# Patient Record
Sex: FEMALE
[De-identification: names, ages, dates, MRNs, and addresses within clinical notes are randomized per-mention and may not be internally consistent; named-entity substitution may affect disease eponyms.]

---

## 2018-02-02 ENCOUNTER — HOSPITAL ENCOUNTER (INPATIENT)
Dept: HOSPITAL 5 - TRG | Age: 28
LOS: 1 days | Discharge: HOME | End: 2018-02-03
Attending: OBSTETRICS & GYNECOLOGY | Admitting: OBSTETRICS & GYNECOLOGY
Payer: MEDICAID

## 2018-02-02 DIAGNOSIS — Z3A.39: ICD-10-CM

## 2018-02-02 DIAGNOSIS — Z23: ICD-10-CM

## 2018-02-02 LAB
HCT VFR BLD CALC: 34.3 % (ref 30.3–42.9)
HGB BLD-MCNC: 11.4 GM/DL (ref 10.1–14.3)
MCH RBC QN AUTO: 29 PG (ref 28–32)
MCHC RBC AUTO-ENTMCNC: 33 % (ref 30–34)
MCV RBC AUTO: 86 FL (ref 79–97)
PLATELET # BLD: 187 K/MM3 (ref 140–440)
RBC # BLD AUTO: 3.99 M/MM3 (ref 3.65–5.03)

## 2018-02-02 PROCEDURE — 10907ZC DRAINAGE OF AMNIOTIC FLUID, THERAPEUTIC FROM PRODUCTS OF CONCEPTION, VIA NATURAL OR ARTIFICIAL OPENING: ICD-10-PCS | Performed by: OBSTETRICS & GYNECOLOGY

## 2018-02-02 PROCEDURE — 85027 COMPLETE CBC AUTOMATED: CPT

## 2018-02-02 PROCEDURE — 86592 SYPHILIS TEST NON-TREP QUAL: CPT

## 2018-02-02 PROCEDURE — 36415 COLL VENOUS BLD VENIPUNCTURE: CPT

## 2018-02-02 PROCEDURE — 86901 BLOOD TYPING SEROLOGIC RH(D): CPT

## 2018-02-02 PROCEDURE — 00HU33Z INSERTION OF INFUSION DEVICE INTO SPINAL CANAL, PERCUTANEOUS APPROACH: ICD-10-PCS | Performed by: OBSTETRICS & GYNECOLOGY

## 2018-02-02 PROCEDURE — 59025 FETAL NON-STRESS TEST: CPT

## 2018-02-02 PROCEDURE — 86900 BLOOD TYPING SEROLOGIC ABO: CPT

## 2018-02-02 PROCEDURE — 0HQ9XZZ REPAIR PERINEUM SKIN, EXTERNAL APPROACH: ICD-10-PCS | Performed by: OBSTETRICS & GYNECOLOGY

## 2018-02-02 PROCEDURE — 86850 RBC ANTIBODY SCREEN: CPT

## 2018-02-02 PROCEDURE — 3E0234Z INTRODUCTION OF SERUM, TOXOID AND VACCINE INTO MUSCLE, PERCUTANEOUS APPROACH: ICD-10-PCS | Performed by: OBSTETRICS & GYNECOLOGY

## 2018-02-02 PROCEDURE — 85018 HEMOGLOBIN: CPT

## 2018-02-02 PROCEDURE — 88307 TISSUE EXAM BY PATHOLOGIST: CPT

## 2018-02-02 PROCEDURE — 85014 HEMATOCRIT: CPT

## 2018-02-02 PROCEDURE — 3E0R3BZ INTRODUCTION OF ANESTHETIC AGENT INTO SPINAL CANAL, PERCUTANEOUS APPROACH: ICD-10-PCS | Performed by: OBSTETRICS & GYNECOLOGY

## 2018-02-02 RX ADMIN — OXYTOCIN SCH MLS/HR: 10 INJECTION, SOLUTION INTRAMUSCULAR; INTRAVENOUS at 18:04

## 2018-02-02 RX ADMIN — OXYTOCIN SCH MLS/HR: 10 INJECTION, SOLUTION INTRAMUSCULAR; INTRAVENOUS at 18:05

## 2018-02-02 RX ADMIN — OXYTOCIN SCH MLS/HR: 10 INJECTION, SOLUTION INTRAMUSCULAR; INTRAVENOUS at 17:06

## 2018-02-02 RX ADMIN — SODIUM CHLORIDE, SODIUM LACTATE, POTASSIUM CHLORIDE, AND CALCIUM CHLORIDE SCH MLS/HR: .6; .31; .03; .02 INJECTION, SOLUTION INTRAVENOUS at 15:57

## 2018-02-02 RX ADMIN — SODIUM CHLORIDE, SODIUM LACTATE, POTASSIUM CHLORIDE, AND CALCIUM CHLORIDE SCH MLS/HR: .6; .31; .03; .02 INJECTION, SOLUTION INTRAVENOUS at 17:53

## 2018-02-02 RX ADMIN — SODIUM CHLORIDE, SODIUM LACTATE, POTASSIUM CHLORIDE, AND CALCIUM CHLORIDE SCH MLS/HR: .6; .31; .03; .02 INJECTION, SOLUTION INTRAVENOUS at 13:17

## 2018-02-02 NOTE — EVENT NOTE
Date: 02/02/18





Late entry. Pt comfortable with epidural. FHTs Category I. Cervix 8/90/-1. AROM

- clear fluid. Second dose of Ampicillin hanging now. Begin pitocin 

augmentation as needed. Continue routine intrapartum care.

## 2018-02-02 NOTE — HISTORY AND PHYSICAL REPORT
History of Present Illness


Date of examination: 18


Date of admission: 


18 08:34





Chief complaint: 





contractions 


History of present illness: 





Pt is a 27 year old -American female  WONG 18 at 39w3d who 

presents with regular painful contractions and cervical change from 3-4 cm with 

ambulation. She denies leakage of fluid and vaginal bleeding. She has had 

insufficient prenatal care x 3 visits at [facility] at 12, 22 and 

28 wks complicated by fetal pyelectasis (followed by MFM), glucose intolerance (

pt never completed her 3 hr GTT). Her GBS status in unknown. 





Past History


Past Medical History: no pertinent history


Past Surgical History: no surgical history


Family/Genetic History: diabetes, heart disease, hypertension


Social history: no significant social history





- Obstetrical History


Expected Date of Delivery: 18


Actual Gestation: 39 Week(s) 3 Day(s) 


: 2


Para: 1


Hx # Term Pregnancies: 1


Number of  Pregnancies: 0


Spontaneous Abortions: 0


Induced : 0


Number of Living Children: 1





Medications and Allergies


 Allergies











Allergy/AdvReac Type Severity Reaction Status Date / Time


 


No Known Allergies Allergy   Verified 18 09:18











 Home Medications











 Medication  Instructions  Recorded  Confirmed  Last Taken  Type


 


Prenatal 2/Iron/Folic Acid/Om3 1 each PO DAILY 18 09:00 

History





[Complete Mitch Dha]    1 











Active Meds: 


Active Medications





Butorphanol Tartrate (Stadol)  2 mg IV Q2H PRN


   PRN Reason: Pain , Severe (7-10)


Ephedrine Sulfate (Ephedrine Sulfate)  10 mg IV Q2M PRN


   PRN Reason: Hypotension


Ephedrine Sulfate (Ephedrine Sulfate)  10 mg IV Q2M PRN


   PRN Reason: Hypotension


Fentanyl (Sublimaze)  100 mcg IV Q2H PRN


   PRN Reason: Labor Pain


Lactated Ringer's (Lactated Ringers)  1,000 mls @ 125 mls/hr IV AS DIRECT LAUREN


   Last Admin: 18 15:57 Dose:  125 mls/hr


Ampicillin Sodium (Polycillin/Ns 1 Gm/50 Ml)  1 gm in 50 mls @ 100 mls/hr IV 

Q4HR LAUREN


   PRN Reason: Protocol


Lactated Ringer's (Lactated Ringers)  1,000 mls @ 125 mls/hr IV AS DIRECT LAUREN


Oxytocin/Sodium Chloride (Pitocin/Ns 20 Unit/1000ml Drip)  20 units in 1,000 

mls @ 125 mls/hr IV AS DIRECT LAUREN


Oxytocin/Sodium Chloride (Pitocin/Ns 30 Unit/500ml)  30 units in 500 mls @ 4 mls

/hr IV TITR LAUREN


   PRN Reason: Protocol


Fentanyl/Bupivacaine/Sodium Chlor (Fentanyl-Bupiv 2 Mcg/Ml-0.125%)  200 mcg in 

100 mls @ 12 mls/hr EPIDURAL TITR LAUREN


   PRN Reason: Protocol


Mineral Oil (Mineral Oil)  30 ml PO QHS PRN


   PRN Reason: Constipation


Naloxone HCl (Narcan 0.4 Mg/1 Ml)  0.1 mg IV Q2MIN PRN


   PRN Reason: Res Rate </= 8 or 02 SAT < 92%


Naloxone HCl (Narcan 2 Mg/2 Ml)  0.2 mg IV Q5M PRN


   PRN Reason: Respiratory sedation


Ondansetron HCl (Zofran)  4 mg IV Q8H PRN


   PRN Reason: Nausea And Vomiting


Terbutaline Sulfate (Brethine)  0.25 mg SUB-Q ONCE PRN


   PRN Reason: Hyperstimulation/Hypertonicity


Terbutaline Sulfate (Brethine)  0.25 mg IVP ONCE PRN


   PRN Reason: Hyperstimulation/Hypertonicity











Review of Systems


All systems: negative





- Vital Signs


Vital signs: 


 Vital Signs











Temp Resp


 


 98.3 F   18 


 


 18 09:21  18 09:21








 











Temp Pulse Resp BP Pulse Ox


 


 98.3 F   93 H  18   110/62   98 


 


 18 09:21  18 16:09  18 09:21  18 16:09  18 16:07














- Physical Exam


Breasts: Positive: deferred


Cardiovascular: Regular rate


Lungs: Positive: Clear to auscultation


Abdomen: Positive: soft (gravid )


Uterus: Positive: enlarged (gravid )





- Obstetrical


FHR: category 1


Uterine Contraction Monitor Mode: External


Cervical Dilatation: 4 (per RN )


Uterine Contraction Pattern: Irregular


Uterine Tone Measurement Phase: Resting


Uterine Contraction Intensity: Moderate





Results


Result Diagrams: 


 18 11:00





 Abnormal lab results











  18 Range/Units





  11:00 


 


WBC  12.2 H  (4.5-11.0)  K/mm3








All other labs normal.








Assessment and Plan





A: IUP at 39w3d


    Labor


    Insufficient Prenatal Care


    GBS unknown 


    Fetal Pyelectasis 





P: Admit to labor and delivery.


    GBS prophyalxis


    Routine intrapartum care.

## 2018-02-02 NOTE — PROCEDURE NOTE
OB Delivery Note





- Delivery


Date of Delivery: 18


Surgeon: ROSY RODRÍGUEZ


Estimated blood loss: 300cc





- Vaginal


Delivery presentation: vertex


Intrapartum events: PROM->1hr before delivery


Delivery induction: none


Delivery augmentation: rupture of membranes, pitocin


Delivery monitor: external FHT, external uterine


Route of delivery: 


Delivery placenta: spontaneous


Delivery cord: 3 umbilical vessels


Episiotomy: none


Delivery laceration: 1st degree (perineal and vaginal ), other (Bilateral 

periurethral )


Delivery repair: vicryl


Anesthesia: epidural


Delivery comments: 





Pt was last checked and noted to be 8.5 cm. Pt comfortable with epidural 

without complaint of pressure. FHTs noted to not be on tracing. Upon RN entry 

into the room, the  was noted to be in the bed. NICU called for 

resuscitation. Upon on-call MD entry into the room,  was being 

stimulated by NICU staff. Cord clamped and cut and handed to NICU staff at 

warmer. Cord blood not collected. Placenta delivered spontaneously (3VC, intact)

. Vagina and perineum explored. Bilateral periurethral, first degree vaginal 

and first degree periurethral with 3-0 Vicryl in a standard fashion.  

mL. 





- Infant


  ** A


Apgar at 1 minute: 3


Apgar at 5 minutes: 9


Infant Gender: Male (3382g (7lb 7oz) at 1855 pm)

## 2018-02-03 VITALS — DIASTOLIC BLOOD PRESSURE: 56 MMHG | SYSTOLIC BLOOD PRESSURE: 94 MMHG

## 2018-02-03 LAB
HCT VFR BLD CALC: 36.4 % (ref 30.3–42.9)
HGB BLD-MCNC: 12.2 GM/DL (ref 10.1–14.3)

## 2018-02-03 RX ADMIN — IBUPROFEN SCH: 600 TABLET, FILM COATED ORAL at 12:00

## 2018-02-03 RX ADMIN — IBUPROFEN SCH MG: 600 TABLET, FILM COATED ORAL at 06:20

## 2018-02-03 RX ADMIN — IBUPROFEN SCH MG: 600 TABLET, FILM COATED ORAL at 18:25

## 2018-02-03 RX ADMIN — IBUPROFEN SCH MG: 600 TABLET, FILM COATED ORAL at 00:13

## 2018-02-03 NOTE — DISCHARGE SUMMARY
Providers





- Providers


Date of Admission: 


18 08:34





Date of discharge: 18


Attending physician: 


ROSY RODRÍGUEZ





 





18 22:24


Consult to Lactation Consultant [CONS] Routine 


   Reason For Exam: assistance with breastfeeding, SNS











Primary care physician: 


ROSY RODRÍGUEZ








Hospitalization


Reason for admission: active labor, IUP at term


Delivery: 


Episiotomy: none


Laceration: 1st degree


Other postpartum procedures: none


Postpartum complications: none


Discharge diagnosis: IUP at term delivered


 baby: male


Condition at discharge: Good


Disposition: DC-01 TO HOME OR SELFCARE





Plan





- Discharge Medications


Prescriptions: 


Ibuprofen [Motrin 600 MG tab] 600 mg PO Q6HR #30 tablet





- Provider Discharge Summary


Activity: routine, no sex for 6 weeks, no heavy lifting 4 weeks, no strenuous 

exercise


Diet: routine


Instructions: routine


Additional instructions: 


[]  Smoking cessation referral if applicable(refer to patient education folder 

for contact #)


[]  Refer to [facility] Booklet








Call your doctor immediately for:


* Fever > 100.5


* Heavy vaginal bleeding ( >1 pad per hour)


* Severe persistent headache


* Shortness of breath


* Reddened, hot, painful area to leg or breast


* Drainage or odor from incision.





* Keep incision clean and dry at all times and follow doctor's instructions 

regarding bathing/showering











- Follow up plan


Follow up: 


ROSY RODRÍGUEZ MD [Primary Care Provider] - 


YOBANI BUSTOS CNM [Advanced Practice Nurse] - 


(RTO 4 weeks postpartum


)

## 2018-02-03 NOTE — PROGRESS NOTE
Assessment and Plan





O: VSS AF


A: Stable PP Day 1


PP H/H: 12.2/36.4


P: Stable PP Day 1


D/C home per request





Subjective





- Subjective


Date of service: 18


Patient reports: appetite normal, voiding normally, pain well controlled, 

ambulating normally


[place]: doing well, nursing well





Objective





- Vital Signs


Latest vital signs: 


 Vital Signs











  Temp Pulse Resp BP BP Pulse Ox


 


 18 08:26  98.5 F  77  18   105/64  99


 


 18 06:20    18   


 


 18 00:13    18   


 


 18 00:00  98.4 F  89  20   104/59  96


 


 18 21:00  97.7 F  68  18   99/49 


 


 18 20:24   78   111/60  


 


 18 20:09   66   117/74  


 


 18 19:54   75   113/73  


 


 18 19:40   71     97


 


 18 19:39   77   109/66  


 


 18 19:35   80     98


 


 18 19:30   93 H     100


 


 18 19:25   92 H     99


 


 18 19:24   94 H   137/64  


 


 18 19:20   78     98


 


 18 19:15   78     98


 


 18 19:10   87     98


 


 18 19:09  97.6 F  82  18   109/66 


 


 18 19:07   86   107/55  


 


 18 19:05   88   118/63  


 


 18 19:03   103 H   124/60  


 


 18 19:02   96 H   134/59  


 


 18 18:57   76   104/55   98


 


 18 18:55   74   104/58  


 


 18 18:53   81   102/55  


 


 18 18:52   80     97


 


 18 18:51   78   103/58  


 


 18 18:50   104 H     94


 


 18 18:49   80   101/60  


 


 18 18:47   80   103/57   95


 


 18 18:45   77   100/57  


 


 18 18:43   81   100/57  


 


 18 18:42   87     96


 


 18 18:41   92 H   103/61  


 


 02/02/18 18:39   80   102/58  


 


 02/02/18 18:37   78   103/54   97


 


 02/02/18 18:35   80   102/56  


 


 02/02/18 18:33   80   105/58  


 


 02/02/18 18:32   75   113/56   97


 


 02/02/18 18:29  98.6 F   18   


 


 02/18 18:27   77   110/56   98


 


 02/02/18 18:25   68   109/60  


 


 02/02/18 18:23   93 H   106/61  


 


 02/02/18 18:22   83     97


 


 02/02/18 18:21   74   106/58  


 


 02/02/18 18:19   70   103/55  


 


 02//18 18:17   75   106/57   97


 


 02/0218 18:15   88   112/64  


 


 02/0218 18:13   93 H   111/61   94


 


 /02/18 18:12   72     97


 


 02/02/18 18:11   82   111/61  


 


 02/02/18 18:09   76   110/63  


 


 02/02/18 18:07   85   103/62   96


 


 02/0218 18:05   80   109/64  


 


 02/02/18 18:03   92 H   115/67  


 


 02/02/18 18:02   78     100


 


 02/0218 18:01   99 H   116/67  


 


 02/02/18 17:59   77   110/64  


 


 02/02/18 17:57   90   108/62   98


 


 /02/18 17:55   81   110/64  


 


 02/02/18 17:53   78   118/67  


 


 02/02/18 17:52   84     99


 


 02/02/18 17:51   90   121/64  


 


 02/02/18 17:49   84   121/66  


 


 02/02/18 17:47   78   119/62   98


 


 02/02/18 17:45   94 H   123/68  


 


 02/02/18 17:43   87   108/61  


 


 02/02/18 17:42   84     97


 


 02/02/18 17:41   74   121/71  


 


 02/02/18 17:39   85   117/68  


 


 02/02/18 17:37   75   119/66   99


 


 02/02/18 17:35   92 H   123/68  


 


 02/02/18 17:33   82   117/67  


 


 02/02/18 17:32   74     99


 


 02/02/18 17:31   75   116/66  


 


 02/02/18 17:29   75   117/64  


 


 02/02/18 17:27   85   122/66   100


 


 02/02/18 17:25   73   124/67  


 


 02/02/18 17:23   93 H   131/72  


 


 02/02/18 17:22   84     99


 


 02/02/18 17:21   76   121/67  


 


 02/02/18 17:19   87   121/68  


 


 02/02/18 17:17   83   122/69   100


 


 02/0218 17:15   78   115/67  


 


 02/02/18 17:13   73   111/68  


 


 02/0218 17:12   70     99


 


 02/0218 17:11   97 H   116/71  


 


 02/18 17:09   86   113/58  


 


 02/ 17:07   87   116/60   99


 


 02/0218 17:05   93 H   130/67  


 


 02/0218 17:03   83   119/61  


 


 02/18 17:02   76     99


 


 02/18 17:01   83   123/65  


 


 02//18 16:59   85   124/63  


 


 02/02/18 16:57   83   126/59   99


 


 02/0218 16:56   85   121/65  


 


 02/0218 16:53   85   116/65  


 


 02/02/18 16:52   87     100


 


 02/0218 16:51   92 H   114/65  


 


 02/02/18 16:49   71   112/59  


 


 02/02/18 16:47   79   110/59   99


 


 /02/18 16:45   74   111/57  


 


 02/02/18 16:43   78   111/57  


 


 02/02/18 16:42   72     99


 


 02/02/18 16:41   88   112/61  


 


 02/02/18 16:39   73   111/56  


 


 02/02/18 16:37   86   109/61   99


 


 02/02/18 16:35   87   117/56  


 


 02/02/18 16:33   86   114/56  


 


 02/02/18 16:32   95 H     99


 


 02/02/18 16:31   88   112/55  


 


 02/02/18 16:29   94 H   115/59  


 


 02/02/18 16:28   90   139/71  


 


 02/02/18 16:27   92 H     100


 


 02/02/18 16:25   89   106/67  


 


 02/02/18 16:23   104 H   107/63  


 


 02/02/18 16:22   92 H     99


 


 02/02/18 16:21   86   112/63  


 


 18 16:19   100 H   108/62  


 


 18 16:17   94 H   108/60   100


 


 18 16:15   93 H   118/61  


 


 18 16:13   97 H   111/63  


 


 18 16:12   94 H     97


 


 18 16:11   93 H   109/63  


 


 18 16:09   93 H   110/62  


 


 18 16:07   108 H   112/66   98


 


 18 16:05   96 H   107/62  


 


 18 16:03   98 H   109/61  


 


 18 16:02   95 H     98


 


 18 16:01   96 H   107/64  


 


 18 15:57   85     99


 


 18 15:52   89     99


 


 18 15:47   81     98


 


 18 15:42   85     97


 


 18 15:37   85     96


 


 18 15:32   101 H     97


 


 18 15:29   85   113/69  


 


 18 15:27   77     97








 Intake and Output











 18





 06:59 14:59 22:59


 


Intake Total 600 1560 


 


Output Total 650 400 


 


Balance -50 1160 


 


Intake:   


 


  Oral 600 960 


 


  Intake, [place]  600 


 


Output:   


 


  Urine 650 400 


 


    Void 650 400 


 


Other:   


 


  Total, Intake Amount 240 240 


 


  Total, Output Amount 650 400 


 


  # Voids   


 


    Void 1 1 














- Exam


Breasts: Present: normal, breastfeeding


Lungs: Present: Normal air movement


Abdomen: Present: normal appearance, soft.  Absent: distention, tenderness


Vulva: both: normal


Uterus: Present: normal, firm, fundal height below umbilicus (2 below U, ML).  

Absent: bogginess, tenderness


Extremities: Present: normal